# Patient Record
Sex: FEMALE | Race: WHITE | NOT HISPANIC OR LATINO | Employment: OTHER | URBAN - METROPOLITAN AREA
[De-identification: names, ages, dates, MRNs, and addresses within clinical notes are randomized per-mention and may not be internally consistent; named-entity substitution may affect disease eponyms.]

---

## 2017-01-11 NOTE — TELEPHONE ENCOUNTER
Patient's daughter Brittany Murrieta [phone], called stating that patient needs a new prescription for her hydrocodone-acetaminophen.  Patient was last seen in the office on 12/13/16.  Please print prescription.

## 2017-01-12 RX ORDER — HYDROCODONE BITARTRATE AND ACETAMINOPHEN 5; 325 MG/1; MG/1
1 TABLET ORAL EVERY 8 HOURS
Qty: 90 TABLET | Refills: 0 | Status: SHIPPED | OUTPATIENT
Start: 2017-01-12 | End: 2017-02-08 | Stop reason: SDUPTHER

## 2017-02-08 RX ORDER — HYDROCODONE BITARTRATE AND ACETAMINOPHEN 5; 325 MG/1; MG/1
1 TABLET ORAL EVERY 8 HOURS
Qty: 90 TABLET | Refills: 0 | Status: SHIPPED | OUTPATIENT
Start: 2017-02-08 | End: 2017-03-07 | Stop reason: SDUPTHER

## 2017-02-08 NOTE — TELEPHONE ENCOUNTER
Patient's daughter, Brittany Murrieta, called stating that patient needs a prescription for hydrocodone-acetaminophen.  Patient was last seen in the office on 12/13/16.  Please print prescription.

## 2017-03-07 RX ORDER — HYDROCODONE BITARTRATE AND ACETAMINOPHEN 5; 325 MG/1; MG/1
1 TABLET ORAL EVERY 8 HOURS
Qty: 90 TABLET | Refills: 0 | Status: SHIPPED | OUTPATIENT
Start: 2017-03-07 | End: 2017-04-03 | Stop reason: SDUPTHER

## 2017-03-07 NOTE — TELEPHONE ENCOUNTER
Patient's daughter, Brittany, called stating that patient needs a prescription for hydrocodone-acetaminophen.  Patient was last seen in the office on 12/13/16.  Please print prescription.

## 2017-04-03 RX ORDER — HYDROCODONE BITARTRATE AND ACETAMINOPHEN 5; 325 MG/1; MG/1
1 TABLET ORAL EVERY 8 HOURS
Qty: 90 TABLET | Refills: 0 | Status: SHIPPED | OUTPATIENT
Start: 2017-04-03 | End: 2017-05-02 | Stop reason: SDUPTHER

## 2017-04-03 NOTE — TELEPHONE ENCOUNTER
Patient's daughter notified that the prescription is ready for  and that patient needs to be seen.  Appointment scheduled.

## 2017-04-03 NOTE — TELEPHONE ENCOUNTER
Patient's daughter, Brittany, called stating that patient needs a prescription for hydrocodone-acetaminophen.  Patient was last seen in the office on 12/13/16.

## 2017-04-25 ENCOUNTER — OFFICE VISIT (OUTPATIENT)
Dept: INTERNAL MEDICINE | Facility: CLINIC | Age: 82
End: 2017-04-25

## 2017-04-25 VITALS
SYSTOLIC BLOOD PRESSURE: 134 MMHG | OXYGEN SATURATION: 95 % | WEIGHT: 143.4 LBS | TEMPERATURE: 98.1 F | DIASTOLIC BLOOD PRESSURE: 80 MMHG | HEART RATE: 99 BPM | BODY MASS INDEX: 25.41 KG/M2 | HEIGHT: 63 IN

## 2017-04-25 DIAGNOSIS — G89.29 CHRONIC LOW BACK PAIN, UNSPECIFIED BACK PAIN LATERALITY, WITH SCIATICA PRESENCE UNSPECIFIED: Primary | ICD-10-CM

## 2017-04-25 DIAGNOSIS — R41.3 MEMORY LOSS: ICD-10-CM

## 2017-04-25 DIAGNOSIS — E55.9 VITAMIN D DEFICIENCY: ICD-10-CM

## 2017-04-25 DIAGNOSIS — M54.5 CHRONIC LOW BACK PAIN, UNSPECIFIED BACK PAIN LATERALITY, WITH SCIATICA PRESENCE UNSPECIFIED: Primary | ICD-10-CM

## 2017-04-25 DIAGNOSIS — I10 ESSENTIAL HYPERTENSION: ICD-10-CM

## 2017-04-25 DIAGNOSIS — E78.2 MIXED HYPERLIPIDEMIA: ICD-10-CM

## 2017-04-25 PROCEDURE — 99214 OFFICE O/P EST MOD 30 MIN: CPT | Performed by: FAMILY MEDICINE

## 2017-04-25 NOTE — PROGRESS NOTES
Subjective   Liliana Murrieta is a 85 y.o. female.     Chief Complaint   Patient presents with   • Back Pain     follow up    Vitamin D deficiency hypertension hyperlipidemia memory loss      History of Present Illness patient comes in follow-up of chronic medical problems as listed above.  She has moved close to her daughter and the family's check senna her daily she has no specific concerns other than intermittent low back pain which has been chronic in nature that seems well controlled with present use of hydrocodone is no constipation and no headache no chest pain shortness of breath or increase fatigue.  She does have some repeating of short-term events and has some memory loss    The following portions of the patient's history were reviewed and updated as appropriate: allergies, current medications, past family history, past medical history, past social history, past surgical history and problem list.    Review of Systems   Constitutional: Negative for chills, fever and unexpected weight change.   HENT: Negative.    Eyes: Negative for pain, itching and visual disturbance.   Respiratory: Negative for chest tightness and shortness of breath.    Cardiovascular: Negative for chest pain.   Gastrointestinal: Negative for abdominal pain, blood in stool, constipation and diarrhea.   Endocrine: Negative.    Genitourinary: Negative.    Musculoskeletal: Negative for joint swelling.   Skin: Negative for color change, rash and wound.   Allergic/Immunologic: Negative.    Neurological: Negative for syncope and speech difficulty.   Hematological: Negative for adenopathy.   Psychiatric/Behavioral: Negative for confusion, hallucinations and suicidal ideas.       Objective   Physical Exam   Constitutional: She is oriented to person, place, and time. She appears well-developed and well-nourished. No distress.   HENT:   Head: Normocephalic and atraumatic.   Mouth/Throat: Oropharynx is clear and moist.   Eyes: Conjunctivae and EOM  are normal. Pupils are equal, round, and reactive to light. Right eye exhibits no discharge. Left eye exhibits no discharge. No scleral icterus.   Neck: Normal range of motion. Neck supple. No tracheal deviation present. No thyromegaly present.   Cardiovascular: Normal rate, regular rhythm, normal heart sounds, intact distal pulses and normal pulses.  Exam reveals no gallop.    No murmur heard.  Pulmonary/Chest: Effort normal and breath sounds normal. No respiratory distress. She has no wheezes. She has no rales.   Abdominal: Soft. Bowel sounds are normal.   Musculoskeletal: Normal range of motion.   Neurological: She is alert and oriented to person, place, and time. She exhibits normal muscle tone. Coordination normal.   Skin: Skin is warm. No rash noted. No erythema. No pallor.   Psychiatric: She has a normal mood and affect. Her behavior is normal. Judgment and thought content normal.   Nursing note and vitals reviewed.      Assessment/Plan   Liliana was seen today for back pain.    Diagnoses and all orders for this visit:    Chronic low back pain, unspecified back pain laterality, with sciatica presence unspecified    Essential hypertension  -     Comprehensive Metabolic Panel; Future    Memory loss    Vitamin D deficiency  -     Vitamin D 25 Hydroxy; Future    Mixed hyperlipidemia  -     Lipid Panel; Future      Follow-up results of the lumbar continue present management of chronic medical problems.  She'll follow up in 3 months for ongoing hydrocodone treatment for low back pain she is well supported by family

## 2017-05-02 RX ORDER — HYDROCODONE BITARTRATE AND ACETAMINOPHEN 5; 325 MG/1; MG/1
1 TABLET ORAL EVERY 8 HOURS
Qty: 90 TABLET | Refills: 0 | Status: SHIPPED | OUTPATIENT
Start: 2017-05-02 | End: 2017-05-31 | Stop reason: SDUPTHER

## 2017-06-01 RX ORDER — HYDROCODONE BITARTRATE AND ACETAMINOPHEN 5; 325 MG/1; MG/1
1 TABLET ORAL EVERY 8 HOURS
Qty: 90 TABLET | Refills: 0 | Status: SHIPPED | OUTPATIENT
Start: 2017-06-01 | End: 2017-06-27 | Stop reason: SDUPTHER

## 2017-06-22 ENCOUNTER — RESULTS ENCOUNTER (OUTPATIENT)
Dept: INTERNAL MEDICINE | Facility: CLINIC | Age: 82
End: 2017-06-22

## 2017-06-22 DIAGNOSIS — I10 ESSENTIAL HYPERTENSION: ICD-10-CM

## 2017-06-22 DIAGNOSIS — E55.9 VITAMIN D DEFICIENCY: ICD-10-CM

## 2017-06-22 DIAGNOSIS — E78.2 MIXED HYPERLIPIDEMIA: ICD-10-CM

## 2017-06-27 RX ORDER — HYDROCODONE BITARTRATE AND ACETAMINOPHEN 5; 325 MG/1; MG/1
1 TABLET ORAL EVERY 8 HOURS
Qty: 90 TABLET | Refills: 0 | Status: SHIPPED | OUTPATIENT
Start: 2017-06-27 | End: 2017-07-20 | Stop reason: SDUPTHER

## 2017-06-27 NOTE — TELEPHONE ENCOUNTER
Patient's daughter, Brittany, called stating that patient needs a prescription for hydrocodone-acetaminophen.  Patient was last seen in the office on 4/25/17.

## 2017-07-20 ENCOUNTER — TELEPHONE (OUTPATIENT)
Dept: INTERNAL MEDICINE | Facility: CLINIC | Age: 82
End: 2017-07-20

## 2017-07-20 NOTE — TELEPHONE ENCOUNTER
Patient's daughter, Brittany, called stating that patient needs a prescription for hydrocodone-acetaminophen.  Patient was last seen int he office on 2/10/17.  She will need to refill her medication on 7/28/17.

## 2017-07-28 RX ORDER — HYDROCODONE BITARTRATE AND ACETAMINOPHEN 5; 325 MG/1; MG/1
1 TABLET ORAL EVERY 8 HOURS
Qty: 90 TABLET | Refills: 0 | Status: SHIPPED | OUTPATIENT
Start: 2017-07-28 | End: 2017-08-22 | Stop reason: SDUPTHER

## 2017-07-28 NOTE — TELEPHONE ENCOUNTER
Last date seen was written in error - patient was last seen in the office on 4/25/17.  Patient has an appointment scheduled to be seen in the office on 8/3/17 but she will be out of her medication today.  Please advise.

## 2017-08-03 ENCOUNTER — OFFICE VISIT (OUTPATIENT)
Dept: INTERNAL MEDICINE | Facility: CLINIC | Age: 82
End: 2017-08-03

## 2017-08-03 VITALS
WEIGHT: 148.6 LBS | OXYGEN SATURATION: 95 % | HEIGHT: 63 IN | HEART RATE: 85 BPM | TEMPERATURE: 98.6 F | DIASTOLIC BLOOD PRESSURE: 82 MMHG | BODY MASS INDEX: 26.33 KG/M2 | SYSTOLIC BLOOD PRESSURE: 136 MMHG

## 2017-08-03 DIAGNOSIS — M54.5 CHRONIC LOW BACK PAIN, UNSPECIFIED BACK PAIN LATERALITY, WITH SCIATICA PRESENCE UNSPECIFIED: ICD-10-CM

## 2017-08-03 DIAGNOSIS — I10 ESSENTIAL HYPERTENSION: ICD-10-CM

## 2017-08-03 DIAGNOSIS — Z00.00 INITIAL MEDICARE ANNUAL WELLNESS VISIT: ICD-10-CM

## 2017-08-03 DIAGNOSIS — E78.2 MIXED HYPERLIPIDEMIA: Primary | ICD-10-CM

## 2017-08-03 DIAGNOSIS — G89.29 CHRONIC LOW BACK PAIN, UNSPECIFIED BACK PAIN LATERALITY, WITH SCIATICA PRESENCE UNSPECIFIED: ICD-10-CM

## 2017-08-03 DIAGNOSIS — R41.3 MEMORY LOSS: ICD-10-CM

## 2017-08-03 PROCEDURE — 90670 PCV13 VACCINE IM: CPT | Performed by: FAMILY MEDICINE

## 2017-08-03 PROCEDURE — 90715 TDAP VACCINE 7 YRS/> IM: CPT | Performed by: FAMILY MEDICINE

## 2017-08-03 PROCEDURE — G0438 PPPS, INITIAL VISIT: HCPCS | Performed by: FAMILY MEDICINE

## 2017-08-03 PROCEDURE — G0009 ADMIN PNEUMOCOCCAL VACCINE: HCPCS | Performed by: FAMILY MEDICINE

## 2017-08-03 PROCEDURE — 90471 IMMUNIZATION ADMIN: CPT | Performed by: FAMILY MEDICINE

## 2017-08-03 PROCEDURE — 99214 OFFICE O/P EST MOD 30 MIN: CPT | Performed by: FAMILY MEDICINE

## 2017-08-03 NOTE — PROGRESS NOTES
Liliana Murrieta is a 85 y.o. female.  Seen 08/03/2017    Assessment/Plan   Problem List Items Addressed This Visit        Cardiovascular and Mediastinum    Hyperlipidemia - Primary    Relevant Orders    Lipid Panel    Hypertension       Nervous and Auditory    Low back pain       Other    Memory loss    Initial Medicare annual wellness visit           Follow-up results of lipid panel and CMP continue present management of chronic low back pain and hypertension as prescribed as well as lipid management otherwise follow up in 6 months    Subjective     Chief Complaint   Patient presents with   • follow up to hypertension   • follow up to back pain   • follow up to memory issues   • Initial Medicare Wellness     Social History   Substance Use Topics   • Smoking status: Never Smoker   • Smokeless tobacco: Never Used   • Alcohol use No       History of Present Illness   Patient comes in for routine follow-up for chronic medical problems hypertension hyperlipidemia, low back pain to memory issues she stopped taking Namenda because it didn't seem to help in her doesn't know any different since she stopped it is any continue off the medication her blood pressure well-controlled she has no chest pain shortness of breath or increase fatigue.  Her chronic low back pain is controlled with present dose of hydrocodone she has known one side effects with dizziness or fatigue or constipation should like continue the same    The following portions of the patient's history were reviewed and updated as appropriate:PMHroutine: Social history , Past Medical History, Allergies, Current Medications, Active Problem List and Health Maintenance    Review of Systems   Constitutional: Negative for activity change, appetite change and unexpected weight change.   HENT: Negative for nosebleeds and trouble swallowing.    Eyes: Negative for pain and visual disturbance.   Respiratory: Negative for chest tightness, shortness of breath and wheezing.   "  Cardiovascular: Negative for chest pain and palpitations.   Gastrointestinal: Negative for abdominal pain and blood in stool.   Endocrine: Negative.    Genitourinary: Negative for difficulty urinating and hematuria.   Musculoskeletal: Negative for joint swelling.   Skin: Negative for color change and rash.   Allergic/Immunologic: Negative.    Neurological: Negative for syncope and speech difficulty.   Hematological: Negative for adenopathy.   Psychiatric/Behavioral: Negative for agitation and confusion.   All other systems reviewed and are negative.      Objective   Vitals:    08/03/17 1302   BP: 136/82   BP Location: Right arm   Patient Position: Sitting   Cuff Size: Adult   Pulse: 85   Temp: 98.6 °F (37 °C)   TempSrc: Oral   SpO2: 95%   Weight: 148 lb 9.6 oz (67.4 kg)   Height: 63\" (160 cm)     Body mass index is 26.32 kg/(m^2).  Physical Exam   Constitutional: She is oriented to person, place, and time. She appears well-developed and well-nourished. No distress.   HENT:   Head: Normocephalic and atraumatic.   Mouth/Throat: Oropharynx is clear and moist.   Eyes: Conjunctivae and EOM are normal. Pupils are equal, round, and reactive to light. Right eye exhibits no discharge. Left eye exhibits no discharge. No scleral icterus.   Neck: Normal range of motion. Neck supple. No tracheal deviation present. No thyromegaly present.   Cardiovascular: Normal rate, regular rhythm, normal heart sounds, intact distal pulses and normal pulses.  Exam reveals no gallop.    No murmur heard.  Pulmonary/Chest: Effort normal and breath sounds normal. No respiratory distress. She has no wheezes. She has no rales.   Abdominal: Soft. Bowel sounds are normal.   Musculoskeletal: Normal range of motion.   Neurological: She is alert and oriented to person, place, and time. She exhibits normal muscle tone. Coordination normal.   Skin: Skin is warm. No rash noted. No erythema. No pallor.   Psychiatric: She has a normal mood and affect. Her " behavior is normal. Judgment and thought content normal.   Nursing note and vitals reviewed.    Reviewed Data:  No visits with results within 1 Month(s) from this visit.  Latest known visit with results is:    Office Visit on 06/28/2016   Component Date Value Ref Range Status   • Glucose 06/28/2016 103* 65 - 99 mg/dL Final   • BUN 06/28/2016 16  8 - 27 mg/dL Final   • Creatinine 06/28/2016 1.25* 0.57 - 1.00 mg/dL Final   • eGFR Non African Am 06/28/2016 40* >59 mL/min/1.73 Final   • eGFR African Am 06/28/2016 46* >59 mL/min/1.73 Final   • BUN/Creatinine Ratio 06/28/2016 13  11 - 26 Final   • Sodium 06/28/2016 146* 134 - 144 mmol/L Final   • Potassium 06/28/2016 4.3  3.5 - 5.2 mmol/L Final   • Chloride 06/28/2016 103  97 - 108 mmol/L Final   • Total CO2 06/28/2016 24  18 - 29 mmol/L Final   • Calcium 06/28/2016 9.2  8.7 - 10.3 mg/dL Final   • Report Summary 06/28/2016 FINAL   Final    Comment: ====================================================================  [facility] DRUG [facility]NALYS[facility]  ====================================================================  Test                             Result       Flag       Units  Drug Present    Hydrocodone                    974                     ng/mg creat    Hydromorphone                  112                     ng/mg creat    Dihydrocodeine                 219                     ng/mg creat    Norhydrocodone                 975                     ng/mg creat     Sources of hydrocodone include scheduled prescription     medications. Hydromorphone, dihydrocodeine and norhydrocodone are     expected metabolites of hydrocodone. Hydromorphone and     dihydrocodeine are also available as scheduled prescription     medications.    Citalopram                     PRESENT    Desmethylcitalopram            PRESENT     Desmethylcitalopram is an expected metabolite of citalopram or     the enantiomeric form, escitalopram.    Stephan mcintyre                   PRESENT    Salicylate                     PRESENT  ====================================================================  Test                      Result    Flag   Units      Ref Range    Creatinine              375              mg/dL      >=20  ====================================================================  Declared Medications:   Medication list was not provided.  ====================================================================  For clinical consultation, please call [phone].  ====================================================================     • PDF Image 06/28/2016 .   Final

## 2017-08-03 NOTE — PROGRESS NOTES
QUICK REFERENCE INFORMATION:  The ABCs of the Annual Wellness Visit    Initial Medicare Wellness Visit    HEALTH RISK ASSESSMENT    10/6/1931    Recent Hospitalizations:  No hospitalization(s) within the last year..        Current Medical Providers:  Patient Care Team:  Azael Copeland MD as PCP - General  Azael Copeland MD as PCP - Claims Attributed        Smoking Status:  History   Smoking Status   • Never Smoker   Smokeless Tobacco   • Never Used       Alcohol Consumption:  History   Alcohol Use No       Depression Screen:   PHQ-9 Depression Screening 8/3/2017   Little interest or pleasure in doing things 0   Feeling down, depressed, or hopeless 0   Trouble falling or staying asleep, or sleeping too much -   Feeling tired or having little energy -   Poor appetite or overeating -   Feeling bad about yourself - or that you are a failure or have let yourself or your family down -   Trouble concentrating on things, such as reading the newspaper or watching television -   Moving or speaking so slowly that other people could have noticed. Or the opposite - being so fidgety or restless that you have been moving around a lot more than usual -   Thoughts that you would be better off dead, or of hurting yourself in some way -   PHQ-9 Total Score 0   If you checked off any problems, how difficult have these problems made it for you to do your work, take care of things at home, or get along with other people? -       Health Habits and Functional and Cognitive Screening:  Functional & Cognitive Status 8/3/2017   Do you have difficulty preparing food and eating? No   Do you have difficulty bathing yourself? No   Do you have difficulty getting dressed? No   Do you have difficulty using the toilet? No   Do you have difficulty moving around from place to place? No   In the past year have you fallen or experienced a near fall? No   Do you need help using the phone?  No   Are you deaf or do you have serious difficulty hearing?   Yes   Do you need help with transportation? Yes   Do you need help shopping? Yes   Do you need help preparing meals?  No   Do you need help with housework?  Yes   Do you need help with laundry? Yes   Do you need help taking your medications? Yes   Do you need help managing money? Yes   Do you have difficulty concentrating, remembering or making decisions? Yes       Health Habits  Current Diet: Limited Junk Food  Dental Exam: Not up to date  Eye Exam: Not up to date  Exercise (times per week): 0 times per week  Current Exercise Activities Include: None          Does the patient have evidence of cognitive impairment? Yes    Asiprin use counseling: Taking ASA appropriately as indicated      Recent Lab Results:    Visual Acuity:  No exam data present    Age-appropriate Screening Schedule:  Refer to the list below for future screening recommendations based on patient's age, sex and/or medical conditions. Orders for these recommended tests are listed in the plan section. The patient has been provided with a written plan.    Health Maintenance   Topic Date Due   • TDAP/TD VACCINES (1 - Tdap) 10/06/1950   • PNEUMOCOCCAL VACCINES (65+ LOW/MEDIUM RISK) (1 of 2 - PCV13) 10/06/1996   • LIPID PANEL  03/22/2017   • INFLUENZA VACCINE  09/01/2017   • MAMMOGRAM  Excluded   • ZOSTER VACCINE  Excluded        Subjective   History of Present Illness    Liliana Murrieta is a 85 y.o. female who presents for an Annual Wellness Visit.    The following portions of the patient's history were reviewed and updated as appropriate: allergies, current medications, past family history, past medical history, past social history, past surgical history and problem list.    Outpatient Medications Prior to Visit   Medication Sig Dispense Refill   • amLODIPine (NORVASC) 2.5 MG tablet Take 1 tablet by mouth daily.     • aspirin 81 MG tablet Take 1 tablet by mouth daily.     • Calcium 600 MG tablet Take 1 tablet by mouth daily.     • Cholecalciferol  "(VITAMIN D3) 5000 UNITS tablet Take 1 tablet by mouth daily.     • HYDROcodone-acetaminophen (NORCO) 5-325 MG per tablet Take 1 tablet by mouth Every 8 (Eight) Hours. 90 tablet 0   • Multiple Vitamin (MULTIVITAMIN) capsule Take 1 capsule by mouth daily.     • Omega-3 Fatty Acids (FISH OIL) 1000 MG capsule capsule Take 1 capsule by mouth daily.     • rosuvastatin (CRESTOR) 40 MG tablet Take 0.5 tablets by mouth daily.     • escitalopram (LEXAPRO) 20 MG tablet Take 1 tablet by mouth daily.     • memantine (NAMENDA) 10 MG tablet TAKE ONE-HALF TO 1 TABLET BY MOUTH EVERY DAY 30 tablet 2   • oxybutynin XL (DITROPAN-XL) 5 MG 24 hr tablet Take 1 tablet by mouth daily.       No facility-administered medications prior to visit.        Patient Active Problem List   Diagnosis   • Chronic renal impairment, stage 3 (moderate)   • Depression   • Hypertonicity of bladder   • Hyperlipidemia   • Hypertension   • Low back pain   • Memory loss   • Cobalamin deficiency   • Vitamin D deficiency   • Allergic conjunctivitis   • Initial Medicare annual wellness visit       Advance Care Planning:  has an advance directive - a copy HAS NOT been provided. Have asked the patient to send this to us to add to record.    Identification of Risk Factors:  Risk factors include: cardiovascular risk, chronic pain and cognitive impairment.    Review of Systems    Compared to one year ago, the patient feels her physical health is the same.  Compared to one year ago, the patient feels her mental health is the same.    Objective     Physical Exam    Vitals:    08/03/17 1302   BP: 136/82   BP Location: Right arm   Patient Position: Sitting   Cuff Size: Adult   Pulse: 85   Temp: 98.6 °F (37 °C)   TempSrc: Oral   SpO2: 95%   Weight: 148 lb 9.6 oz (67.4 kg)   Height: 63\" (160 cm)   PainSc:   8       Body mass index is 26.32 kg/(m^2).  Discussed the patient's BMI with her. The BMI is in the acceptable range.    Assessment/Plan   Patient Self-Management and " Personalized Health Advice  The patient has been provided with information about: mental health concerns and preventive services including:   · Advance directive.    Visit Diagnoses:    ICD-10-CM ICD-9-CM   1. Mixed hyperlipidemia E78.2 272.2   2. Essential hypertension I10 401.9   3. Memory loss R41.3 780.93   4. Chronic low back pain, unspecified back pain laterality, with sciatica presence unspecified M54.5 724.2    G89.29 338.29   5. Initial Medicare annual wellness visit Z00.00 V70.0       Orders Placed This Encounter   Procedures   • Pneumococcal Conjugate Vaccine 13-Valent All   • Tdap Vaccine Greater Than or Equal To 6yo IM   • Lipid Panel       Outpatient Encounter Prescriptions as of 8/3/2017   Medication Sig Dispense Refill   • amLODIPine (NORVASC) 2.5 MG tablet Take 1 tablet by mouth daily.     • aspirin 81 MG tablet Take 1 tablet by mouth daily.     • Calcium 600 MG tablet Take 1 tablet by mouth daily.     • Cholecalciferol (VITAMIN D3) 5000 UNITS tablet Take 1 tablet by mouth daily.     • HYDROcodone-acetaminophen (NORCO) 5-325 MG per tablet Take 1 tablet by mouth Every 8 (Eight) Hours. 90 tablet 0   • Multiple Vitamin (MULTIVITAMIN) capsule Take 1 capsule by mouth daily.     • Omega-3 Fatty Acids (FISH OIL) 1000 MG capsule capsule Take 1 capsule by mouth daily.     • rosuvastatin (CRESTOR) 40 MG tablet Take 0.5 tablets by mouth daily.     • [DISCONTINUED] escitalopram (LEXAPRO) 20 MG tablet Take 1 tablet by mouth daily.     • [DISCONTINUED] memantine (NAMENDA) 10 MG tablet TAKE ONE-HALF TO 1 TABLET BY MOUTH EVERY DAY 30 tablet 2   • [DISCONTINUED] oxybutynin XL (DITROPAN-XL) 5 MG 24 hr tablet Take 1 tablet by mouth daily.       No facility-administered encounter medications on file as of 8/3/2017.        Reviewed use of high risk medication in the elderly: yes  Reviewed for potential of harmful drug interactions in the elderly: yes    Follow Up:  Follow up as needed written immunizations were provided  for patient to obtain through pharmacy otherwise follow-up chronic medical problems    An After Visit Summary and PPPS with all of these plans were given to the patient.

## 2017-08-09 LAB
CHOLEST SERPL-MCNC: 218 MG/DL (ref 0–200)
HDLC SERPL-MCNC: 52 MG/DL (ref 40–60)
LDLC SERPL CALC-MCNC: 117 MG/DL (ref 0–100)
TRIGL SERPL-MCNC: 247 MG/DL (ref 0–150)
VLDLC SERPL CALC-MCNC: 49.4 MG/DL (ref 5–40)

## 2017-08-10 ENCOUNTER — TELEPHONE (OUTPATIENT)
Dept: INTERNAL MEDICINE | Facility: CLINIC | Age: 82
End: 2017-08-10

## 2017-08-22 ENCOUNTER — TELEPHONE (OUTPATIENT)
Dept: INTERNAL MEDICINE | Facility: CLINIC | Age: 82
End: 2017-08-22

## 2017-08-22 NOTE — TELEPHONE ENCOUNTER
Patient's daughter, Brittany, called stating that she would like to  a prescription for hydrocodone-acetaminophen.  Patient was last seen in the office on 8/3/17.

## 2017-08-23 RX ORDER — HYDROCODONE BITARTRATE AND ACETAMINOPHEN 5; 325 MG/1; MG/1
1 TABLET ORAL EVERY 8 HOURS
Qty: 90 TABLET | Refills: 0 | Status: SHIPPED | OUTPATIENT
Start: 2017-08-23 | End: 2017-09-18 | Stop reason: SDUPTHER

## 2017-08-30 RX ORDER — AMLODIPINE BESYLATE 2.5 MG/1
2.5 TABLET ORAL DAILY
Qty: 90 TABLET | Refills: 0 | Status: SHIPPED | OUTPATIENT
Start: 2017-08-30 | End: 2017-11-27 | Stop reason: SDUPTHER

## 2017-08-30 RX ORDER — ROSUVASTATIN CALCIUM 40 MG/1
20 TABLET, COATED ORAL DAILY
Qty: 45 TABLET | Refills: 0 | Status: SHIPPED | OUTPATIENT
Start: 2017-08-30 | End: 2017-11-27 | Stop reason: SDUPTHER

## 2017-09-18 ENCOUNTER — TELEPHONE (OUTPATIENT)
Dept: INTERNAL MEDICINE | Facility: CLINIC | Age: 82
End: 2017-09-18

## 2017-09-18 ENCOUNTER — HOSPITAL ENCOUNTER (EMERGENCY)
Facility: HOSPITAL | Age: 82
Discharge: HOME | End: 2017-09-18
Attending: EMERGENCY MEDICINE | Admitting: EMERGENCY MEDICINE

## 2017-09-18 VITALS
HEIGHT: 63 IN | HEART RATE: 114 BPM | DIASTOLIC BLOOD PRESSURE: 85 MMHG | TEMPERATURE: 98.8 F | OXYGEN SATURATION: 96 % | BODY MASS INDEX: 24.8 KG/M2 | RESPIRATION RATE: 16 BRPM | SYSTOLIC BLOOD PRESSURE: 125 MMHG | WEIGHT: 140 LBS

## 2017-09-18 DIAGNOSIS — L02.419 ABSCESS OF LOWER EXTREMITY: Primary | ICD-10-CM

## 2017-09-18 LAB
ALBUMIN SERPL-MCNC: 4.1 G/DL (ref 3.5–5.2)
ALBUMIN/GLOB SERPL: 1.7 G/DL
ALP SERPL-CCNC: 72 U/L (ref 39–117)
ALT SERPL W P-5'-P-CCNC: 8 U/L (ref 1–33)
ANION GAP SERPL CALCULATED.3IONS-SCNC: 12.9 MMOL/L
AST SERPL-CCNC: 15 U/L (ref 1–32)
BASOPHILS # BLD AUTO: 0.02 10*3/MM3 (ref 0–0.2)
BASOPHILS NFR BLD AUTO: 0.2 % (ref 0–1.5)
BILIRUB SERPL-MCNC: 0.6 MG/DL (ref 0.1–1.2)
BUN BLD-MCNC: 13 MG/DL (ref 8–23)
BUN/CREAT SERPL: 11 (ref 7–25)
CALCIUM SPEC-SCNC: 9.4 MG/DL (ref 8.6–10.5)
CHLORIDE SERPL-SCNC: 104 MMOL/L (ref 98–107)
CO2 SERPL-SCNC: 26.1 MMOL/L (ref 22–29)
CREAT BLD-MCNC: 1.18 MG/DL (ref 0.57–1)
DEPRECATED RDW RBC AUTO: 49.4 FL (ref 37–54)
EOSINOPHIL # BLD AUTO: 0.17 10*3/MM3 (ref 0–0.7)
EOSINOPHIL NFR BLD AUTO: 2.1 % (ref 0.3–6.2)
ERYTHROCYTE [DISTWIDTH] IN BLOOD BY AUTOMATED COUNT: 13.7 % (ref 11.7–13)
GFR SERPL CREATININE-BSD FRML MDRD: 44 ML/MIN/1.73
GLOBULIN UR ELPH-MCNC: 2.4 GM/DL
GLUCOSE BLD-MCNC: 177 MG/DL (ref 65–99)
HCT VFR BLD AUTO: 41.8 % (ref 35.6–45.5)
HGB BLD-MCNC: 13.6 G/DL (ref 11.9–15.5)
IMM GRANULOCYTES # BLD: 0 10*3/MM3 (ref 0–0.03)
IMM GRANULOCYTES NFR BLD: 0 % (ref 0–0.5)
LYMPHOCYTES # BLD AUTO: 2.11 10*3/MM3 (ref 0.9–4.8)
LYMPHOCYTES NFR BLD AUTO: 26 % (ref 19.6–45.3)
MCH RBC QN AUTO: 32.4 PG (ref 26.9–32)
MCHC RBC AUTO-ENTMCNC: 32.5 G/DL (ref 32.4–36.3)
MCV RBC AUTO: 99.5 FL (ref 80.5–98.2)
MONOCYTES # BLD AUTO: 0.37 10*3/MM3 (ref 0.2–1.2)
MONOCYTES NFR BLD AUTO: 4.6 % (ref 5–12)
NEUTROPHILS # BLD AUTO: 5.46 10*3/MM3 (ref 1.9–8.1)
NEUTROPHILS NFR BLD AUTO: 67.1 % (ref 42.7–76)
PLATELET # BLD AUTO: 247 10*3/MM3 (ref 140–500)
PMV BLD AUTO: 10 FL (ref 6–12)
POTASSIUM BLD-SCNC: 4 MMOL/L (ref 3.5–5.2)
PROT SERPL-MCNC: 6.5 G/DL (ref 6–8.5)
RBC # BLD AUTO: 4.2 10*6/MM3 (ref 3.9–5.2)
SODIUM BLD-SCNC: 143 MMOL/L (ref 136–145)
WBC NRBC COR # BLD: 8.13 10*3/MM3 (ref 4.5–10.7)

## 2017-09-18 PROCEDURE — 85025 COMPLETE CBC W/AUTO DIFF WBC: CPT | Performed by: PHYSICIAN ASSISTANT

## 2017-09-18 PROCEDURE — 36415 COLL VENOUS BLD VENIPUNCTURE: CPT | Performed by: PHYSICIAN ASSISTANT

## 2017-09-18 PROCEDURE — 80053 COMPREHEN METABOLIC PANEL: CPT | Performed by: PHYSICIAN ASSISTANT

## 2017-09-18 PROCEDURE — 99283 EMERGENCY DEPT VISIT LOW MDM: CPT

## 2017-09-18 RX ORDER — DOXYCYCLINE 100 MG/1
100 CAPSULE ORAL 2 TIMES DAILY
Qty: 20 CAPSULE | Refills: 0 | Status: SHIPPED | OUTPATIENT
Start: 2017-09-18 | End: 2017-10-06

## 2017-09-18 RX ORDER — HYDROCODONE BITARTRATE AND ACETAMINOPHEN 5; 325 MG/1; MG/1
1 TABLET ORAL EVERY 8 HOURS
Qty: 90 TABLET | Refills: 0 | Status: SHIPPED | OUTPATIENT
Start: 2017-09-18 | End: 2017-10-16 | Stop reason: SDUPTHER

## 2017-09-18 NOTE — TELEPHONE ENCOUNTER
Patient's daughter called stating that patient needed a refill for hydrocodone-acetaminophen.  Patient was last seen in the office on 8/3/17.

## 2017-09-18 NOTE — ED PROVIDER NOTES
" EMERGENCY DEPARTMENT ENCOUNTER    CHIEF COMPLAINT  Chief Complaint: wound infection  History given by: pt  History limited by: none  Room Number:   PMD: Azael Copeland MD      HPI:  Pt is a 85 y.o. female who presents for a wound infection on the lateral aspect of her RLE. The lesion was first noticed roughly one week ago initially as a \"crusty lesion.\"  This weekend it became \"raised and hot.\" It also released white drainage. She was seen at the [facility] earlier today and they lanced the lesion, drained purulent discharge and cauterized it. She denies having associated fever, chest pain, SOA or other sx at this time.     Duration/Onset/Timin week/gradual/constant  Location: lateral aspect of RLE  Radiation: none stated  Quality: initially started as a \"crusty lesion\" now it has released purulent discharge  Intensity/Severity: mild  Associated Symptoms: none stated  Aggravating or Alleviating Factors: none stated  Previous Episodes: none stated      PAST MEDICAL HISTORY  Active Ambulatory Problems     Diagnosis Date Noted   • Chronic renal impairment, stage 3 (moderate) 2016   • Depression 2016   • Hypertonicity of bladder 2016   • Hyperlipidemia 2016   • Hypertension 2016   • Low back pain 2016   • Memory loss 2016   • Cobalamin deficiency 2016   • Vitamin D deficiency 2016   • Allergic conjunctivitis 2016   • Initial Medicare annual wellness visit 2017     Resolved Ambulatory Problems     Diagnosis Date Noted   • Fatigue 2016     Past Medical History:   Diagnosis Date   • History of dysuria    • History of headache    • History of syncope    • History of urinary tract infection    • Hyperlipidemia    • Hypertension        PAST SURGICAL HISTORY  Past Surgical History:   Procedure Laterality Date   • APPENDECTOMY     • TONSILLECTOMY         FAMILY HISTORY  Family History   Problem Relation Age of Onset   • Hypertension Mother    • Cancer " Father      colon cancer       SOCIAL HISTORY  Social History     Social History   • Marital status:      Spouse name: N/A   • Number of children: 2   • Years of education: N/A     Occupational History   • retired      Social History Main Topics   • Smoking status: Never Smoker   • Smokeless tobacco: Never Used   • Alcohol use No   • Drug use: Not on file   • Sexual activity: Not on file     Other Topics Concern   • Not on file     Social History Narrative       ALLERGIES  Amoxicillin-pot clavulanate    REVIEW OF SYSTEMS  Review of Systems   Constitutional: Negative for fever.   Respiratory: Negative for shortness of breath.    Cardiovascular: Negative for chest pain.       PHYSICAL EXAM  ED Triage Vitals   Temp Heart Rate Resp BP SpO2   09/18/17 1426 09/18/17 1426 09/18/17 1431 09/18/17 1439 09/18/17 1426   98.8 °F (37.1 °C) 114 16 125/85 96 %      Temp src Heart Rate Source Patient Position BP Location FiO2 (%)   -- -- -- -- --              Physical Exam   Constitutional: No distress.   HENT:   Head: Normocephalic and atraumatic.   Cardiovascular: Regular rhythm.    Pulmonary/Chest: Effort normal and breath sounds normal. No respiratory distress.   Abdominal: There is no tenderness.   Musculoskeletal: She exhibits no tenderness.   Skin: No rash noted.   There is a lesion (2cm x 2cm) with cauterized blood on top and surrounding erythema, mild warmth and swelling.    Nursing note and vitals reviewed.      LAB RESULTS  Lab Results (last 24 hours)     Procedure Component Value Units Date/Time    CBC & Differential [[id number]] Collected:  09/18/17 1449    Specimen:  Blood Updated:  09/18/17 1513    Narrative:       The following orders were created for panel order CBC & Differential.  Procedure                               Abnormality         Status                     ---------                               -----------         ------                     CBC Auto Differential[[id number]]        Abnormal             Final result                 Please view results for these tests on the individual orders.    Comprehensive Metabolic Panel [[id number]]  (Abnormal) Collected:  09/18/17 1449    Specimen:  Blood Updated:  09/18/17 1526     Glucose 177 (H) mg/dL      BUN 13 mg/dL      Creatinine 1.18 (H) mg/dL      Sodium 143 mmol/L      Potassium 4.0 mmol/L      Chloride 104 mmol/L      CO2 26.1 mmol/L      Calcium 9.4 mg/dL      Total Protein 6.5 g/dL      Albumin 4.10 g/dL      ALT (SGPT) 8 U/L      AST (SGOT) 15 U/L      Alkaline Phosphatase 72 U/L      Total Bilirubin 0.6 mg/dL      eGFR Non African Amer 44 (L) mL/min/1.73      Globulin 2.4 gm/dL      A/G Ratio 1.7 g/dL      BUN/Creatinine Ratio 11.0     Anion Gap 12.9 mmol/L     Narrative:       The MDRD GFR formula is only valid for adults with stable renal function between ages 18 and 70.    CBC Auto Differential [[id number]]  (Abnormal) Collected:  09/18/17 1449    Specimen:  Blood Updated:  09/18/17 1513     WBC 8.13 10*3/mm3      RBC 4.20 10*6/mm3      Hemoglobin 13.6 g/dL      Hematocrit 41.8 %      MCV 99.5 (H) fL      MCH 32.4 (H) pg      MCHC 32.5 g/dL      RDW 13.7 (H) %      RDW-SD 49.4 fl      MPV 10.0 fL      Platelets 247 10*3/mm3      Neutrophil % 67.1 %      Lymphocyte % 26.0 %      Monocyte % 4.6 (L) %      Eosinophil % 2.1 %      Basophil % 0.2 %      Immature Grans % 0.0 %      Neutrophils, Absolute 5.46 10*3/mm3      Lymphocytes, Absolute 2.11 10*3/mm3      Monocytes, Absolute 0.37 10*3/mm3      Eosinophils, Absolute 0.17 10*3/mm3      Basophils, Absolute 0.02 10*3/mm3      Immature Grans, Absolute 0.00 10*3/mm3           I ordered the above labs and reviewed the results      PROCEDURES  Procedures      PROGRESS AND CONSULTS  ED Course   1446: CBC and CMP were ordered by Andres Storey prior to my arrival.     1800: Informed pt that her abscess is likely caused by staph infection and is common. Discussed plan to d/c with Rx for doxycycline and an applied  chinmay. Instructed her to avoid long periods of strenuous activity. Pt understands and agrees with the plan. All questions answered.      MEDICAL DECISION MAKING  Results were reviewed/discussed with the patient and they were also made aware of online access. Pt also made aware that some labs, such as cultures, will not be resulted during ER visit and follow up with PMD is necessary.     MDM  Number of Diagnoses or Management Options     Amount and/or Complexity of Data Reviewed  Clinical lab tests: ordered and reviewed (Lab results are unremarkable)  Decide to obtain previous medical records or to obtain history from someone other than the patient: yes  Review and summarize past medical records: yes (No pertinent history)    Patient Progress  Patient progress: stable         DIAGNOSIS  Final diagnoses:   Abscess of lower extremity       DISPOSITION  DISCHARGE    Patient discharged in stable condition.    Reviewed implications of results, diagnosis, meds, responsibility to follow up, warning signs and symptoms of possible worsening, potential complications and reasons to return to ER, including new or worsening sx.    Patient/Family voiced understanding of above instructions.    Discussed plan for discharge, as there is no emergent indication for admission.  Pt/family is agreeable and understands need for follow up and repeat testing.  Pt is aware that discharge does not mean that nothing is wrong but it indicates no emergency is present that requires admission and they must continue care with follow-up as given below or physician of their choice.     FOLLOW-UP  No follow-up provider specified.       Medication List      Notice     No changes were made to your prescriptions during this visit.            Latest Documented Vital Signs:  As of 6:12 PM  BP- 125/85 HR- 114 Temp- 98.8 °F (37.1 °C) O2 sat- 96%    --  Documentation assistance provided by shirley Barrow for Dr. Sanders.  Information recorded by the  scribe was done at my direction and has been verified and validated by me.     Oly Barrow  09/18/17 1812       Ty Sanders MD  09/18/17 1817

## 2017-09-20 ENCOUNTER — PATIENT OUTREACH (OUTPATIENT)
Dept: CASE MANAGEMENT | Facility: OTHER | Age: 82
End: 2017-09-20

## 2017-09-21 ENCOUNTER — PATIENT OUTREACH (OUTPATIENT)
Dept: CASE MANAGEMENT | Facility: OTHER | Age: 82
End: 2017-09-21

## 2017-09-21 ENCOUNTER — TELEPHONE (OUTPATIENT)
Dept: SOCIAL WORK | Facility: HOSPITAL | Age: 82
End: 2017-09-21

## 2017-09-21 ENCOUNTER — TELEPHONE (OUTPATIENT)
Dept: INTERNAL MEDICINE | Facility: CLINIC | Age: 82
End: 2017-09-21

## 2017-09-21 NOTE — TELEPHONE ENCOUNTER
ER F/U phone call:   Spoke with pts daughter, who is waiting to hear from her PCP to make an earlier appointment. No other questions or concerns voiced at this time. Ifrah Espinoza RN

## 2017-09-21 NOTE — TELEPHONE ENCOUNTER
"Oly ([phone]) from [facility] care advisors called and stated that patient was taken to the urgent care on 9/18 to have an abscess lanced off of her leg. Patient's daughter informed Oly that it \"exploded like a volcano.\" Urgent care informed the patient that the abscess was more than they would handle at their location so they cauterized the abscess and sent the patient to the emergency room. Patient was discharged from the ER the same day with a prescription for an antibiotic. Patient's daughter informed Oly that the wound is warm to the touch, red, and \"looks like it has a plug on top of it.\" Patient scheduled a hospital follow-up for 10/2, however the patient will be out of the antibiotic by then and would like to see Dr. Copeland sooner. Please advise.  "

## 2017-09-21 NOTE — OUTREACH NOTE
Received call from Trini at PCP office, patient can be seen 9/22/17 at 11am, I have called Brittany patient's daughter and notified her of this appointment.

## 2017-09-21 NOTE — TELEPHONE ENCOUNTER
Oly notified. Appointment scheduled. Oly stated she would contact patient's daughter and inform her.

## 2017-09-21 NOTE — OUTREACH NOTE
"Number listed is patient's daughter, she reports lesion on patient's leg is not better and can not get an appointment until 10/2 to see PCP, I called and left a message with Dr. Copeland's MA that she needs to be seen ASAP. Daughter describes lesion as having a \"bump\" on the top due to UC cauterizing it, area is warm to touch and red. She is cleansing with H2O2 and betadine with gauze. Her antibiotic will run out 10 days before her date to see PCP. Teaching done on s/s of infection and need to seek help PRN. I will f/u with daughter after call back from MA.Explained role of Care Advisor and contact information given to patient.No other questions or concerns voiced at this time.  "

## 2017-09-22 ENCOUNTER — OFFICE VISIT (OUTPATIENT)
Dept: INTERNAL MEDICINE | Facility: CLINIC | Age: 82
End: 2017-09-22

## 2017-09-22 VITALS
HEIGHT: 63 IN | DIASTOLIC BLOOD PRESSURE: 82 MMHG | BODY MASS INDEX: 25.82 KG/M2 | WEIGHT: 145.7 LBS | TEMPERATURE: 98.6 F | HEART RATE: 98 BPM | SYSTOLIC BLOOD PRESSURE: 142 MMHG | OXYGEN SATURATION: 97 %

## 2017-09-22 DIAGNOSIS — IMO0001: Primary | ICD-10-CM

## 2017-09-22 PROBLEM — S70.251A: Status: ACTIVE | Noted: 2017-09-22

## 2017-09-22 PROBLEM — S90.551A: Status: ACTIVE | Noted: 2017-09-22

## 2017-09-22 PROBLEM — S70.351A: Status: ACTIVE | Noted: 2017-09-22

## 2017-09-22 PROBLEM — S80.851A: Status: ACTIVE | Noted: 2017-09-22

## 2017-09-22 PROCEDURE — 99213 OFFICE O/P EST LOW 20 MIN: CPT | Performed by: FAMILY MEDICINE

## 2017-09-22 NOTE — PROGRESS NOTES
"Liliana Murrieta is a 85 y.o. female.  Seen 09/22/2017    Assessment/Plan   Problem List Items Addressed This Visit        Other    Superficial foreign body (splinter) of right hip, thigh, leg, and ankle, without major open wound - Primary    Relevant Orders    Anaerobic & Aerobic Culture ([facility] Only)           Dressing change daily suspect infected sebaceous cyst follow-up in 3 weeks  Continue doxycycline until finished follow-up if symptoms worsen sooner than 3 weeks  Subjective     Chief Complaint   Patient presents with   • follow up to [facility]     wound right lower leg     Social History   Substance Use Topics   • Smoking status: Never Smoker   • Smokeless tobacco: Never Used   • Alcohol use No       History of Present Illness   Patient with wound of right lower leg that has developed over the last couple weeks to urgent care and ER placed on doxycycline wound was expressed.  For follow-up no fever previous records were reviewed  The following portions of the patient's history were reviewed and updated as appropriate:PMHroutine: Social history , Past Medical History, Allergies, Current Medications, Active Problem List and Health Maintenance    Review of Systems   Constitutional: Negative.    Cardiovascular: Negative.    Musculoskeletal: Negative.    Skin: Positive for wound. Negative for color change, pallor and rash.       Objective   Vitals:    09/22/17 1102   BP: 142/82   BP Location: Left arm   Patient Position: Sitting   Cuff Size: Adult   Pulse: 98   Temp: 98.6 °F (37 °C)   TempSrc: Oral   SpO2: 97%   Weight: 145 lb 11.2 oz (66.1 kg)   Height: 63\" (160 cm)     Body mass index is 25.81 kg/(m^2).  Physical Exam   Constitutional: She appears well-developed and well-nourished.   Lymphadenopathy:        Right axillary: No pectoral adenopathy present.        Right: No inguinal adenopathy present.   Skin:   Lateral right lower leg erythematous area approximately 3 x 3 cm with a 1 cm " ulcerated center wound culture was obtained   Nursing note and vitals reviewed.    Reviewed Data:  Admission on 09/18/2017, Discharged on 09/18/2017   Component Date Value Ref Range Status   • Glucose 09/18/2017 177* 65 - 99 mg/dL Final   • BUN 09/18/2017 13  8 - 23 mg/dL Final   • Creatinine 09/18/2017 1.18* 0.57 - 1.00 mg/dL Final   • Sodium 09/18/2017 143  136 - 145 mmol/L Final   • Potassium 09/18/2017 4.0  3.5 - 5.2 mmol/L Final   • Chloride 09/18/2017 104  98 - 107 mmol/L Final   • CO2 09/18/2017 26.1  22.0 - 29.0 mmol/L Final   • Calcium 09/18/2017 9.4  8.6 - 10.5 mg/dL Final   • Total Protein 09/18/2017 6.5  6.0 - 8.5 g/dL Final   • Albumin 09/18/2017 4.10  3.50 - 5.20 g/dL Final   • ALT (SGPT) 09/18/2017 8  1 - 33 U/L Final   • AST (SGOT) 09/18/2017 15  1 - 32 U/L Final   • Alkaline Phosphatase 09/18/2017 72  39 - 117 U/L Final   • Total Bilirubin 09/18/2017 0.6  0.1 - 1.2 mg/dL Final   • eGFR Non African Amer 09/18/2017 44* >60 mL/min/1.73 Final   • Globulin 09/18/2017 2.4  gm/dL Final   • A/G Ratio 09/18/2017 1.7  g/dL Final   • BUN/Creatinine Ratio 09/18/2017 11.0  7.0 - 25.0 Final   • Anion Gap 09/18/2017 12.9  mmol/L Final   • WBC 09/18/2017 8.13  4.50 - 10.70 10*3/mm3 Final   • RBC 09/18/2017 4.20  3.90 - 5.20 10*6/mm3 Final   • Hemoglobin 09/18/2017 13.6  11.9 - 15.5 g/dL Final   • Hematocrit 09/18/2017 41.8  35.6 - 45.5 % Final   • MCV 09/18/2017 99.5* 80.5 - 98.2 fL Final   • MCH 09/18/2017 32.4* 26.9 - 32.0 pg Final   • MCHC 09/18/2017 32.5  32.4 - 36.3 g/dL Final   • RDW 09/18/2017 13.7* 11.7 - 13.0 % Final   • RDW-SD 09/18/2017 49.4  37.0 - 54.0 fl Final   • MPV 09/18/2017 10.0  6.0 - 12.0 fL Final   • Platelets 09/18/2017 247  140 - 500 10*3/mm3 Final   • Neutrophil % 09/18/2017 67.1  42.7 - 76.0 % Final   • Lymphocyte % 09/18/2017 26.0  19.6 - 45.3 % Final   • Monocyte % 09/18/2017 4.6* 5.0 - 12.0 % Final   • Eosinophil % 09/18/2017 2.1  0.3 - 6.2 % Final   • Basophil % 09/18/2017 0.2  0.0 -  1.5 % Final   • Immature Grans % 09/18/2017 0.0  0.0 - 0.5 % Final   • Neutrophils, Absolute 09/18/2017 5.46  1.90 - 8.10 10*3/mm3 Final   • Lymphocytes, Absolute 09/18/2017 2.11  0.90 - 4.80 10*3/mm3 Final   • Monocytes, Absolute 09/18/2017 0.37  0.20 - 1.20 10*3/mm3 Final   • Eosinophils, Absolute 09/18/2017 0.17  0.00 - 0.70 10*3/mm3 Final   • Basophils, Absolute 09/18/2017 0.02  0.00 - 0.20 10*3/mm3 Final   • Immature Grans, Absolute 09/18/2017 0.00  0.00 - 0.03 10*3/mm3 Final

## 2017-09-25 ENCOUNTER — PATIENT OUTREACH (OUTPATIENT)
Dept: CASE MANAGEMENT | Facility: OTHER | Age: 82
End: 2017-09-25

## 2017-09-25 NOTE — OUTREACH NOTE
Daughter reports Pt.'s leg looks much better, she is changing Telfa pad daily, no s/s of infection noted. Antibiotic will be completed in 2 days, she is aware of need to call PCP is wound area changes.No other questions or concerns voiced at this time.

## 2017-09-26 LAB
BACTERIA SPEC AEROBE CULT: NORMAL
BACTERIA SPEC ANAEROBE CULT: NORMAL
BACTERIA SPEC CULT: NORMAL
BACTERIA SPEC CULT: NORMAL

## 2017-09-27 ENCOUNTER — TELEPHONE (OUTPATIENT)
Dept: INTERNAL MEDICINE | Facility: CLINIC | Age: 82
End: 2017-09-27

## 2017-09-27 NOTE — TELEPHONE ENCOUNTER
----- Message from Azael Copeland MD sent at 9/27/2017 12:54 PM EDT -----  No significant bacterial species on culture

## 2017-10-06 ENCOUNTER — OFFICE VISIT (OUTPATIENT)
Dept: INTERNAL MEDICINE | Facility: CLINIC | Age: 82
End: 2017-10-06

## 2017-10-06 VITALS
HEIGHT: 63 IN | BODY MASS INDEX: 25.85 KG/M2 | SYSTOLIC BLOOD PRESSURE: 138 MMHG | WEIGHT: 145.9 LBS | TEMPERATURE: 98.2 F | DIASTOLIC BLOOD PRESSURE: 74 MMHG | OXYGEN SATURATION: 95 % | HEART RATE: 96 BPM

## 2017-10-06 DIAGNOSIS — M79.9 LESION OF SOFT TISSUE OF LOWER LEG AND ANKLE: Primary | ICD-10-CM

## 2017-10-06 PROCEDURE — 99213 OFFICE O/P EST LOW 20 MIN: CPT | Performed by: FAMILY MEDICINE

## 2017-10-06 NOTE — PROGRESS NOTES
"Liliana Murrieta is a 86 y.o. female.  Seen 10/06/2017    Assessment/Plan   Problem List Items Addressed This Visit        Other    Lesion of soft tissue of lower leg and ankle - Primary    Relevant Orders    Ambulatory Referral to General Surgery           As needed for chronic medical problems of hyperlipidemia hypertension or a scheduled in the future    Subjective     Chief Complaint   Patient presents with   • follow up to right leg wound     Social History   Substance Use Topics   • Smoking status: Never Smoker   • Smokeless tobacco: Never Used   • Alcohol use No       History of Present Illness   Patient with leg lesion has healed nicely at this point seem to be initially an infected sebaceous cyst however further examination there is some persistent redness is healing nicely but is concerned that there is a center area that minimally fluctuant  The following portions of the patient's history were reviewed and updated as appropriate:PMHroutine: Social history , Past Medical History, Allergies, Current Medications, Active Problem List and Health Maintenance    Review of Systems   Constitutional: Negative.    HENT: Negative.    Respiratory: Negative.    Cardiovascular: Negative.        Objective   Vitals:    10/06/17 1039   BP: 138/74   BP Location: Right arm   Patient Position: Sitting   Cuff Size: Adult   Pulse: 96   Temp: 98.2 °F (36.8 °C)   TempSrc: Oral   SpO2: 95%   Weight: 145 lb 14.4 oz (66.2 kg)   Height: 63\" (160 cm)     Body mass index is 25.85 kg/(m^2).  Physical Exam   Constitutional: She appears well-developed and well-nourished.   HENT:   Head: Normocephalic and atraumatic.   Skin: No erythema.   Right lower leg lateral area again nodule with some raised vascular border on the periphery with Center cyst like debris   Nursing note and vitals reviewed.    Reviewed Data:  Office Visit on 09/22/2017   Component Date Value Ref Range Status   • Aer Anaer Result 1 09/22/2017 Final report   Final   • " Result 1 09/22/2017 Comment   Final    No anaerobic growth in 72 hours.   • Culture 09/22/2017 Final report   Final   • Result 1 09/22/2017 Mixed skin arely   Final   Admission on 09/18/2017, Discharged on 09/18/2017   Component Date Value Ref Range Status   • Glucose 09/18/2017 177* 65 - 99 mg/dL Final   • BUN 09/18/2017 13  8 - 23 mg/dL Final   • Creatinine 09/18/2017 1.18* 0.57 - 1.00 mg/dL Final   • Sodium 09/18/2017 143  136 - 145 mmol/L Final   • Potassium 09/18/2017 4.0  3.5 - 5.2 mmol/L Final   • Chloride 09/18/2017 104  98 - 107 mmol/L Final   • CO2 09/18/2017 26.1  22.0 - 29.0 mmol/L Final   • Calcium 09/18/2017 9.4  8.6 - 10.5 mg/dL Final   • Total Protein 09/18/2017 6.5  6.0 - 8.5 g/dL Final   • Albumin 09/18/2017 4.10  3.50 - 5.20 g/dL Final   • ALT (SGPT) 09/18/2017 8  1 - 33 U/L Final   • AST (SGOT) 09/18/2017 15  1 - 32 U/L Final   • Alkaline Phosphatase 09/18/2017 72  39 - 117 U/L Final   • Total Bilirubin 09/18/2017 0.6  0.1 - 1.2 mg/dL Final   • eGFR Non African Amer 09/18/2017 44* >60 mL/min/1.73 Final   • Globulin 09/18/2017 2.4  gm/dL Final   • A/G Ratio 09/18/2017 1.7  g/dL Final   • BUN/Creatinine Ratio 09/18/2017 11.0  7.0 - 25.0 Final   • Anion Gap 09/18/2017 12.9  mmol/L Final   • WBC 09/18/2017 8.13  4.50 - 10.70 10*3/mm3 Final   • RBC 09/18/2017 4.20  3.90 - 5.20 10*6/mm3 Final   • Hemoglobin 09/18/2017 13.6  11.9 - 15.5 g/dL Final   • Hematocrit 09/18/2017 41.8  35.6 - 45.5 % Final   • MCV 09/18/2017 99.5* 80.5 - 98.2 fL Final   • MCH 09/18/2017 32.4* 26.9 - 32.0 pg Final   • MCHC 09/18/2017 32.5  32.4 - 36.3 g/dL Final   • RDW 09/18/2017 13.7* 11.7 - 13.0 % Final   • RDW-SD 09/18/2017 49.4  37.0 - 54.0 fl Final   • MPV 09/18/2017 10.0  6.0 - 12.0 fL Final   • Platelets 09/18/2017 247  140 - 500 10*3/mm3 Final   • Neutrophil % 09/18/2017 67.1  42.7 - 76.0 % Final   • Lymphocyte % 09/18/2017 26.0  19.6 - 45.3 % Final   • Monocyte % 09/18/2017 4.6* 5.0 - 12.0 % Final   • Eosinophil %  09/18/2017 2.1  0.3 - 6.2 % Final   • Basophil % 09/18/2017 0.2  0.0 - 1.5 % Final   • Immature Grans % 09/18/2017 0.0  0.0 - 0.5 % Final   • Neutrophils, Absolute 09/18/2017 5.46  1.90 - 8.10 10*3/mm3 Final   • Lymphocytes, Absolute 09/18/2017 2.11  0.90 - 4.80 10*3/mm3 Final   • Monocytes, Absolute 09/18/2017 0.37  0.20 - 1.20 10*3/mm3 Final   • Eosinophils, Absolute 09/18/2017 0.17  0.00 - 0.70 10*3/mm3 Final   • Basophils, Absolute 09/18/2017 0.02  0.00 - 0.20 10*3/mm3 Final   • Immature Grans, Absolute 09/18/2017 0.00  0.00 - 0.03 10*3/mm3 Final

## 2017-10-09 ENCOUNTER — PATIENT OUTREACH (OUTPATIENT)
Dept: CASE MANAGEMENT | Facility: OTHER | Age: 82
End: 2017-10-09

## 2017-10-09 NOTE — OUTREACH NOTE
Spoke with daughter Brittany, patient's leg wound is healing well no s/s of infection, it has decreased to about 1/3 of the original size. Has see PCP for f/u and now has been referred to a surgeon to remove this area due to be suspicious for skin cancer per daughter.No other questions or concerns voiced at this time.Contact info antonian.

## 2017-10-16 ENCOUNTER — TELEPHONE (OUTPATIENT)
Dept: INTERNAL MEDICINE | Facility: CLINIC | Age: 82
End: 2017-10-16

## 2017-10-16 RX ORDER — HYDROCODONE BITARTRATE AND ACETAMINOPHEN 5; 325 MG/1; MG/1
1 TABLET ORAL EVERY 8 HOURS
Qty: 90 TABLET | Refills: 0 | Status: SHIPPED | OUTPATIENT
Start: 2017-10-16 | End: 2017-11-14 | Stop reason: SDUPTHER

## 2017-10-16 NOTE — TELEPHONE ENCOUNTER
Patient's daughter, Brittany, called stating that patient needs a prescription for hydrocodone-acetaminophen.  Patient was last seen in the office on 10/6/17.

## 2017-10-19 ENCOUNTER — OFFICE VISIT (OUTPATIENT)
Dept: SURGERY | Facility: CLINIC | Age: 82
End: 2017-10-19

## 2017-10-19 VITALS
BODY MASS INDEX: 26.94 KG/M2 | WEIGHT: 146.4 LBS | OXYGEN SATURATION: 98 % | HEART RATE: 82 BPM | SYSTOLIC BLOOD PRESSURE: 160 MMHG | HEIGHT: 62 IN | DIASTOLIC BLOOD PRESSURE: 100 MMHG

## 2017-10-19 DIAGNOSIS — L72.3 SEBACEOUS CYST: Primary | ICD-10-CM

## 2017-10-19 PROCEDURE — 88305 TISSUE EXAM BY PATHOLOGIST: CPT | Performed by: SURGERY

## 2017-10-19 PROCEDURE — 11401 EXC TR-EXT B9+MARG 0.6-1 CM: CPT | Performed by: SURGERY

## 2017-10-19 NOTE — PROGRESS NOTES
Procedure   Procedures    Procedures:   After the patient was consented and positioned in supine position, the right lower leg skin lesion area was prepped with alcohol and anesthetized with 1% Xylocaine. Using a 15 blade scalpel, an elliptical incision was performed around the irregular skin lesion. The lesion was grasped and excised in its entirety with a 15 blade scalpel and submitted to pathology for permanent. The wound was then closed with interrupted nylon. A sterile dressing was applied and the patient tolerated procedure well. I have instructed the patient to call the office for pathology results in 2 business days.    Patrizia Mane MD  General, Minimally Invasive and Endoscopic Surgery  [facility]    [address], [address]  [place], [place], [address]  P: [phone]  F: [phone]    Cc:  Azael Copeland MD

## 2017-10-19 NOTE — PROGRESS NOTES
Chief Complaint   Patient presents with   • Mass     lower leg and avel        Patient is a 86 y.o. female referred by Azael Copeland MD for Evaluation of a right lower leg skin lesion.  History is obtained from the patient and her daughter who has accompanied her.  Patient's daughter reports that this lesion started out as a mole several years ago.  Patient's daughter believes that she was picking at it and got it infected.  Patient presented to the emergency room where the patient was told it was infected and was placed on antibiotics.  Upon her follow-up with her primary care doctor, patient was instructed to have the lesion removed.  Patient has no history of skin cancer.  Patient and daughter report that it does intermittently bleed and itch.    Past Medical History:   Diagnosis Date   • Arthritis    • History of dysuria    • History of headache    • History of syncope    • History of urinary tract infection    • Hyperlipidemia    • Hypertension    • Skin lesion      Past Surgical History:   Procedure Laterality Date   • APPENDECTOMY     • COLONOSCOPY     • TONSILLECTOMY       Family History   Problem Relation Age of Onset   • Hypertension Mother    • Colon cancer Mother    • Cancer Father      colon cancer   • Lung cancer Father      Social History   Substance Use Topics   • Smoking status: Never Smoker   • Smokeless tobacco: Never Used   • Alcohol use No         Current Outpatient Prescriptions:   •  amLODIPine (NORVASC) 2.5 MG tablet, Take 1 tablet by mouth Daily., Disp: 90 tablet, Rfl: 0  •  aspirin 81 MG tablet, Take 1 tablet by mouth daily., Disp: , Rfl:   •  Calcium 600 MG tablet, Take 1 tablet by mouth daily., Disp: , Rfl:   •  Cholecalciferol (VITAMIN D3) 5000 UNITS tablet, Take 1 tablet by mouth daily., Disp: , Rfl:   •  HYDROcodone-acetaminophen (NORCO) 5-325 MG per tablet, Take 1 tablet by mouth Every 8 (Eight) Hours., Disp: 90 tablet, Rfl: 0  •  Multiple Vitamin (MULTIVITAMIN) capsule, Take 1  "capsule by mouth daily., Disp: , Rfl:   •  Omega-3 Fatty Acids (FISH OIL) 1000 MG capsule capsule, Take 1 capsule by mouth daily., Disp: , Rfl:   •  rosuvastatin (CRESTOR) 40 MG tablet, Take 0.5 tablets by mouth Daily., Disp: 45 tablet, Rfl: 0    Review of Systems   Cardiovascular: Positive for leg swelling.   Musculoskeletal: Positive for arthralgias and joint swelling.   Hematological: Bruises/bleeds easily.   All other systems reviewed and are negative.      Vitals:    10/19/17 1012   BP: 160/100   Pulse: 82   SpO2: 98%   Weight: 146 lb 6.4 oz (66.4 kg)   Height: 62\" (157.5 cm)       Physical Exam  General/physcological:   Alert and oriented x3 in no acute distress  HEENT: Normal cephalic, atraumatic, PERRLA, EOMI, sclera anicteric, moist mucous membranes, neck is supple, no JVD, no carotid bruits, no thyromegaly no adenopathy  Respiratory: CTA and percussion  CVA: RRR, normal S1-S2, no murmurs, no gallops or rubs  Musculoskeletal: Full range of motion, no clubbing, no cyanosis or edema  Right lower extremity has a raised irregular ulcerated mass without evidence of erythema or fluctuance  Neurovascular: Grossly intact    Patient does not use tobacco products currently and I have counseled the patient to not start using tobacco products in the future.    Assessment:  Right lower leg skin lesion  Plan:  I have recommended that the patient have this excised for further evaluation.    Patrizia Mane MD  General, Minimally Invasive and Endoscopic Surgery  [facility]    [address], [address]  [place], [place], [address]  P: [phone]  F: [phone]    Cc:  Azael Copeland MD                    "

## 2017-10-23 LAB
CYTO UR: NORMAL
LAB AP CASE REPORT: NORMAL
LAB AP CLINICAL INFORMATION: NORMAL
Lab: NORMAL
PATH REPORT.FINAL DX SPEC: NORMAL
PATH REPORT.GROSS SPEC: NORMAL

## 2017-11-02 ENCOUNTER — OFFICE VISIT (OUTPATIENT)
Dept: SURGERY | Facility: CLINIC | Age: 82
End: 2017-11-02

## 2017-11-02 VITALS
HEIGHT: 63 IN | BODY MASS INDEX: 26.12 KG/M2 | DIASTOLIC BLOOD PRESSURE: 90 MMHG | SYSTOLIC BLOOD PRESSURE: 180 MMHG | OXYGEN SATURATION: 95 % | HEART RATE: 100 BPM | WEIGHT: 147.4 LBS

## 2017-11-02 DIAGNOSIS — Z09 FOLLOW UP: Primary | ICD-10-CM

## 2017-11-02 PROCEDURE — 99024 POSTOP FOLLOW-UP VISIT: CPT | Performed by: SURGERY

## 2017-11-02 NOTE — PROGRESS NOTES
"Chief complaint:  Post-op  Follow up    History of Present Illness    This is Liliana Murrieta 86 y.o. status post right lower extremity skin lesion excision and is doing very well.  Patient denies fever, chills, nausea or vomiting.  Patient's pain is well-controlled.      The following portions of the patient's history were reviewed and updated as appropriate: allergies, current medications, past family history, past medical history, past social history, past surgical history and problem list.    Vitals:    11/02/17 1106   BP: 180/90   Pulse: 100   SpO2: 95%   Weight: 147 lb 6.4 oz (66.9 kg)   Height: 63\" (160 cm)       Physical Exam  Incision is well-healed without evidence of infection.    Patient does not use tobacco products currently and I have counseled the patient not to start using tobacco products in the future.    Assessment/plan:  I have discussed the pathology results of a sebaceous cyst and a mole with the patient and daughter who has accompanied her.  I have removed the stitches and placed Steri-Strips.  I have instructed the patient follow-up as needed.    Patrizia Mane MD  General, Minimally Invasive and Endoscopic Surgery  [facility]    [address], [address]  [place], [place], [address]  P: [phone]  F: [phone]    Cc:  Azael Copeland MD  "

## 2017-11-08 DIAGNOSIS — L72.3 SEBACEOUS CYST: Primary | ICD-10-CM

## 2017-11-14 RX ORDER — HYDROCODONE BITARTRATE AND ACETAMINOPHEN 5; 325 MG/1; MG/1
1 TABLET ORAL EVERY 8 HOURS
Qty: 90 TABLET | Refills: 0 | Status: SHIPPED | OUTPATIENT
Start: 2017-11-14 | End: 2017-12-13 | Stop reason: SDUPTHER

## 2017-11-14 NOTE — TELEPHONE ENCOUNTER
Patient's daughter called stating that patient needs a refill for hydrocodone-acetaminophen.  Patient was last seen in the office on 10/6/17.

## 2017-11-28 RX ORDER — AMLODIPINE BESYLATE 2.5 MG/1
TABLET ORAL
Qty: 90 TABLET | Refills: 0 | Status: SHIPPED | OUTPATIENT
Start: 2017-11-28 | End: 2018-01-04 | Stop reason: SDUPTHER

## 2017-11-28 RX ORDER — ROSUVASTATIN CALCIUM 40 MG/1
TABLET, COATED ORAL
Qty: 45 TABLET | Refills: 0 | Status: SHIPPED | OUTPATIENT
Start: 2017-11-28 | End: 2018-01-04 | Stop reason: SDUPTHER

## 2017-12-14 RX ORDER — HYDROCODONE BITARTRATE AND ACETAMINOPHEN 5; 325 MG/1; MG/1
1 TABLET ORAL EVERY 8 HOURS
Qty: 90 TABLET | Refills: 0 | Status: SHIPPED | OUTPATIENT
Start: 2017-12-14 | End: 2018-01-08 | Stop reason: SDUPTHER

## 2018-01-04 ENCOUNTER — OFFICE VISIT (OUTPATIENT)
Dept: INTERNAL MEDICINE | Facility: CLINIC | Age: 83
End: 2018-01-04

## 2018-01-04 VITALS
OXYGEN SATURATION: 94 % | HEIGHT: 63 IN | BODY MASS INDEX: 25.46 KG/M2 | WEIGHT: 143.7 LBS | HEART RATE: 89 BPM | DIASTOLIC BLOOD PRESSURE: 78 MMHG | TEMPERATURE: 97.1 F | RESPIRATION RATE: 16 BRPM | SYSTOLIC BLOOD PRESSURE: 145 MMHG

## 2018-01-04 DIAGNOSIS — R41.3 MEMORY LOSS: Primary | ICD-10-CM

## 2018-01-04 DIAGNOSIS — G89.29 CHRONIC LOW BACK PAIN, UNSPECIFIED BACK PAIN LATERALITY, WITH SCIATICA PRESENCE UNSPECIFIED: ICD-10-CM

## 2018-01-04 DIAGNOSIS — E78.2 MIXED HYPERLIPIDEMIA: ICD-10-CM

## 2018-01-04 DIAGNOSIS — M54.5 CHRONIC LOW BACK PAIN, UNSPECIFIED BACK PAIN LATERALITY, WITH SCIATICA PRESENCE UNSPECIFIED: ICD-10-CM

## 2018-01-04 DIAGNOSIS — I10 ESSENTIAL HYPERTENSION: ICD-10-CM

## 2018-01-04 PROCEDURE — 99214 OFFICE O/P EST MOD 30 MIN: CPT | Performed by: FAMILY MEDICINE

## 2018-01-04 RX ORDER — ROSUVASTATIN CALCIUM 20 MG/1
20 TABLET, COATED ORAL DAILY
Qty: 90 TABLET | Refills: 1 | Status: SHIPPED | OUTPATIENT
Start: 2018-01-04

## 2018-01-04 RX ORDER — AMLODIPINE BESYLATE 2.5 MG/1
2.5 TABLET ORAL DAILY
Qty: 90 TABLET | Refills: 1 | Status: SHIPPED | OUTPATIENT
Start: 2018-01-04

## 2018-01-04 NOTE — PROGRESS NOTES
Liliana Murrieta is a 86 y.o. female.      Assessment/Plan   Problem List Items Addressed This Visit        Cardiovascular and Mediastinum    Hyperlipidemia    Relevant Medications    rosuvastatin (CRESTOR) 20 MG tablet    Hypertension    Relevant Medications    amLODIPine (NORVASC) 2.5 MG tablet       Nervous and Auditory    Low back pain       Other    Memory loss - Primary           Present management of chronic medical problems of hyperlipidemia hypertension low back pain she is presently living in an apartment with frequent checks by heard daughter and granddaughter but having difficulty maintaining her independence is recommended due to her decrease in functional status to explore living in a new assisted living facility where she has her independence as she has no physical deficits at this time but is observable so that she does not injure herself and can be prompted to carry out daily activities such as eating bathing and dressing.      Chief Complaint   Patient presents with   • insurance forms filled out   • follow up on cholesterol   • follow up on HTN     Social History   Substance Use Topics   • Smoking status: Never Smoker   • Smokeless tobacco: Never Used   • Alcohol use No       History of Present Illness   She comes in for follow-up of chronic medical problems of hyperlipidemia hypertension and low back pain she is doing fairly well on her present medication like continue the same does not Remember any blood pressure readings greater than 140/90 she has no chest pain no shortness of breath or increase fatigue she also is here for further evaluation of her memory loss and she is unable to recall named objects or subtract serial sevens she forgets that she just has to question and will repeat it.  The following portions of the patient's history were reviewed and updated as appropriate:PMHroutine: Social history , Past Medical History, Allergies, Current Medications and Health Maintenance    Review of  "Systems   Constitutional: Negative.    HENT: Negative.    Eyes: Negative.    Respiratory: Negative.    Cardiovascular: Negative.    Gastrointestinal: Negative.    Endocrine: Negative.    Genitourinary: Negative.    Musculoskeletal: Positive for back pain. Negative for gait problem.   Skin: Negative.    Allergic/Immunologic: Negative.    Neurological: Negative.  Negative for dizziness, tremors, facial asymmetry, weakness, light-headedness and headaches.   Psychiatric/Behavioral: Positive for decreased concentration. Negative for behavioral problems, confusion, dysphoric mood, self-injury, sleep disturbance and suicidal ideas. The patient is not nervous/anxious.        Objective   Vitals:    01/04/18 0849   BP: 145/78   BP Location: Left arm   Patient Position: Sitting   Cuff Size: Adult   Pulse: 89   Resp: 16   Temp: 97.1 °F (36.2 °C)   TempSrc: Oral   SpO2: 94%   Weight: 65.2 kg (143 lb 11.2 oz)   Height: 160 cm (63\")     Body mass index is 25.46 kg/(m^2).  Physical Exam  Reviewed Data:  No visits with results within 1 Month(s) from this visit.  Latest known visit with results is:    Office Visit on 10/19/2017   Component Date Value Ref Range Status   • Case Report 10/19/2017    Final                    Value:Surgical Pathology Report                         Case: [id number]                                  Authorizing Provider:  Patrizia Mane MD      Collected:           10/19/2017 11:00 AM          Ordering Location:     [facility]     Received:            10/19/2017 10:28 PM                                 [facility]                                                        Pathologist:           Philipp Gomez MD                                                        Specimen:    Leg, Right                                                                                • Clinical Information 10/19/2017    Final                    Value:This result contains rich text formatting which " cannot be displayed here.   • Final Diagnosis 10/19/2017    Final                    Value:This result contains rich text formatting which cannot be displayed here.   • Gross Description 10/19/2017    Final                    Value:This result contains rich text formatting which cannot be displayed here.   • Microscopic Description 10/19/2017    Final                    Value:This result contains rich text formatting which cannot be displayed here.

## 2018-01-08 RX ORDER — HYDROCODONE BITARTRATE AND ACETAMINOPHEN 5; 325 MG/1; MG/1
1 TABLET ORAL EVERY 8 HOURS
Qty: 90 TABLET | Refills: 0 | Status: SHIPPED | OUTPATIENT
Start: 2018-01-08 | End: 2018-01-26 | Stop reason: SDUPTHER

## 2018-01-26 RX ORDER — HYDROCODONE BITARTRATE AND ACETAMINOPHEN 5; 325 MG/1; MG/1
1 TABLET ORAL EVERY 8 HOURS
Qty: 180 TABLET | Refills: 0 | Status: SHIPPED | OUTPATIENT
Start: 2018-01-26

## 2018-01-26 NOTE — TELEPHONE ENCOUNTER
Patient is moving to [place].  She has an appointment to see a Internal Medicine physician the end of February but will need a prescription for hydrocodone-acetaminophen to take with her.

## 2018-02-02 ENCOUNTER — TELEPHONE (OUTPATIENT)
Dept: INTERNAL MEDICINE | Facility: CLINIC | Age: 83
End: 2018-02-02

## 2018-02-02 RX ORDER — ESCITALOPRAM OXALATE 10 MG/1
10 TABLET ORAL DAILY
Qty: 30 TABLET | Refills: 0 | Status: SHIPPED | OUTPATIENT
Start: 2018-02-02

## 2018-02-02 NOTE — TELEPHONE ENCOUNTER
Patient daughter Brittany called wanting to see if you could send RX for Lexapro to Pharm.  States she thinks the move this week will be really stressful and Lexapro has helped in the past.  Please advise.

## 2018-07-13 ENCOUNTER — EPISODE CHANGES (OUTPATIENT)
Dept: CASE MANAGEMENT | Facility: OTHER | Age: 83
End: 2018-07-13